# Patient Record
(demographics unavailable — no encounter records)

---

## 2024-10-17 NOTE — PHYSICAL EXAM
[NL (40)] : plantar flexion 40 degrees [2+] : posterior tibialis pulse: 2+ [Normal] : saphenous nerve sensation normal [Left] : left ankle [No loss of surgical correlation. Bony alignment acceptable. Hardware in appropriate position] : No loss of surgical correlation. Bony alignment acceptable. Hardware in appropriate position [The fracture is in acceptable alignment. There is progression in healing seen] : The fracture is in acceptable alignment. There is progression in healing seen [NL 30)] : inversion 30 degrees [NL (20)] : eversion 20 degrees [5___] : plantar flexion 5[unfilled]/5 [] : non-antalgic [FreeTextEntry9] : Ankle mortise intact.  Lateral malleolus fracture is now completely healed. [TWNoteComboBox7] : dorsiflexion 15 degrees

## 2024-10-17 NOTE — DISCUSSION/SUMMARY
[de-identified] : Patient has recovered well, and can slowly resume activities as tolerated.  Home stretching exercises were encouraged to maintain flexibility. Ice/nsaids can be used as needed. Patient will follow up as needed.

## 2024-10-17 NOTE — HISTORY OF PRESENT ILLNESS
[Dull/Aching] : dull/aching [Localized] : localized [Standing] : standing [Walking] : walking [Stairs] : stairs [de-identified] : Patient returns s/p orif his left bimalleolar fracture from 6/4/24.  He has been wb in sneakers and is no longer going to PT.  He reports doing much better and has no pain at this time.  He has been using a "stem cell patch".  He has no complaints and "feels great". [] : Post Surgical Visit: no [FreeTextEntry5] : PT is here for follow up [de-identified] : splint, scooter